# Patient Record
Sex: FEMALE | URBAN - METROPOLITAN AREA
[De-identification: names, ages, dates, MRNs, and addresses within clinical notes are randomized per-mention and may not be internally consistent; named-entity substitution may affect disease eponyms.]

---

## 2021-05-04 ENCOUNTER — APPOINTMENT (RX ONLY)
Dept: URBAN - METROPOLITAN AREA CLINIC 125 | Facility: CLINIC | Age: 56
Setting detail: DERMATOLOGY
End: 2021-05-04

## 2021-05-04 DIAGNOSIS — D22 MELANOCYTIC NEVI: ICD-10-CM

## 2021-05-04 DIAGNOSIS — L72.0 EPIDERMAL CYST: ICD-10-CM

## 2021-05-04 DIAGNOSIS — Q826 OTHER SPECIFIED ANOMALIES OF SKIN: ICD-10-CM

## 2021-05-04 DIAGNOSIS — Z71.89 OTHER SPECIFIED COUNSELING: ICD-10-CM

## 2021-05-04 DIAGNOSIS — L81.4 OTHER MELANIN HYPERPIGMENTATION: ICD-10-CM

## 2021-05-04 DIAGNOSIS — L82.1 OTHER SEBORRHEIC KERATOSIS: ICD-10-CM

## 2021-05-04 DIAGNOSIS — Q819 OTHER SPECIFIED ANOMALIES OF SKIN: ICD-10-CM

## 2021-05-04 DIAGNOSIS — Q828 OTHER SPECIFIED ANOMALIES OF SKIN: ICD-10-CM

## 2021-05-04 DIAGNOSIS — L60.8 OTHER NAIL DISORDERS: ICD-10-CM

## 2021-05-04 DIAGNOSIS — D18.0 HEMANGIOMA: ICD-10-CM

## 2021-05-04 DIAGNOSIS — L71.8 OTHER ROSACEA: ICD-10-CM

## 2021-05-04 PROBLEM — D18.01 HEMANGIOMA OF SKIN AND SUBCUTANEOUS TISSUE: Status: ACTIVE | Noted: 2021-05-04

## 2021-05-04 PROBLEM — L85.8 OTHER SPECIFIED EPIDERMAL THICKENING: Status: ACTIVE | Noted: 2021-05-04

## 2021-05-04 PROBLEM — D22.5 MELANOCYTIC NEVI OF TRUNK: Status: ACTIVE | Noted: 2021-05-04

## 2021-05-04 PROCEDURE — 99203 OFFICE O/P NEW LOW 30 MIN: CPT

## 2021-05-04 PROCEDURE — ? SUNSCREEN RECOMMENDATIONS

## 2021-05-04 PROCEDURE — ? COUNSELING

## 2021-05-04 PROCEDURE — ? NOTED ON EXAM BUT NOT TREATED

## 2021-05-04 ASSESSMENT — LOCATION DETAILED DESCRIPTION DERM
LOCATION DETAILED: LEFT PROXIMAL POSTERIOR UPPER ARM
LOCATION DETAILED: RIGHT PROXIMAL POSTERIOR UPPER ARM
LOCATION DETAILED: SUPERIOR THORACIC SPINE
LOCATION DETAILED: RIGHT INDEX FINGERNAIL
LOCATION DETAILED: LEFT CENTRAL MALAR CHEEK
LOCATION DETAILED: PERIUMBILICAL SKIN
LOCATION DETAILED: RIGHT MEDIAL BREAST 12-1:00 REGION
LOCATION DETAILED: LEFT MID-UPPER BACK
LOCATION DETAILED: LEFT SUPERIOR UPPER BACK
LOCATION DETAILED: UPPER STERNUM
LOCATION DETAILED: LEFT INDEX FINGERNAIL

## 2021-05-04 ASSESSMENT — LOCATION SIMPLE DESCRIPTION DERM
LOCATION SIMPLE: RIGHT INDEX FINGERNAIL
LOCATION SIMPLE: LEFT POSTERIOR UPPER ARM
LOCATION SIMPLE: UPPER BACK
LOCATION SIMPLE: RIGHT BREAST
LOCATION SIMPLE: ABDOMEN
LOCATION SIMPLE: CHEST
LOCATION SIMPLE: LEFT UPPER BACK
LOCATION SIMPLE: LEFT CHEEK
LOCATION SIMPLE: RIGHT POSTERIOR UPPER ARM
LOCATION SIMPLE: LEFT INDEX FINGERNAIL

## 2021-05-04 ASSESSMENT — LOCATION ZONE DERM
LOCATION ZONE: ARM
LOCATION ZONE: FACE
LOCATION ZONE: FINGERNAIL
LOCATION ZONE: TRUNK

## 2023-07-19 ENCOUNTER — APPOINTMENT (RX ONLY)
Dept: URBAN - METROPOLITAN AREA CLINIC 89 | Facility: CLINIC | Age: 58
Setting detail: DERMATOLOGY
End: 2023-07-19

## 2023-07-19 DIAGNOSIS — Z71.89 OTHER SPECIFIED COUNSELING: ICD-10-CM

## 2023-07-19 DIAGNOSIS — L82.1 OTHER SEBORRHEIC KERATOSIS: ICD-10-CM

## 2023-07-19 DIAGNOSIS — D485 NEOPLASM OF UNCERTAIN BEHAVIOR OF SKIN: ICD-10-CM

## 2023-07-19 DIAGNOSIS — D22 MELANOCYTIC NEVI: ICD-10-CM

## 2023-07-19 DIAGNOSIS — L81.4 OTHER MELANIN HYPERPIGMENTATION: ICD-10-CM

## 2023-07-19 PROBLEM — D22.5 MELANOCYTIC NEVI OF TRUNK: Status: ACTIVE | Noted: 2023-07-19

## 2023-07-19 PROBLEM — D48.5 NEOPLASM OF UNCERTAIN BEHAVIOR OF SKIN: Status: ACTIVE | Noted: 2023-07-19

## 2023-07-19 PROCEDURE — ? BIOPSY BY SHAVE METHOD

## 2023-07-19 PROCEDURE — 99213 OFFICE O/P EST LOW 20 MIN: CPT | Mod: 25

## 2023-07-19 PROCEDURE — ? SUNSCREEN RECOMMENDATIONS

## 2023-07-19 PROCEDURE — 11102 TANGNTL BX SKIN SINGLE LES: CPT

## 2023-07-19 PROCEDURE — ? COUNSELING

## 2023-07-19 ASSESSMENT — LOCATION ZONE DERM
LOCATION ZONE: LEG
LOCATION ZONE: TRUNK

## 2023-07-19 ASSESSMENT — LOCATION DETAILED DESCRIPTION DERM
LOCATION DETAILED: UPPER STERNUM
LOCATION DETAILED: LEFT ANTERIOR DISTAL THIGH
LOCATION DETAILED: SUPERIOR THORACIC SPINE
LOCATION DETAILED: LEFT MID-UPPER BACK

## 2023-07-19 ASSESSMENT — LOCATION SIMPLE DESCRIPTION DERM
LOCATION SIMPLE: LEFT UPPER BACK
LOCATION SIMPLE: CHEST
LOCATION SIMPLE: UPPER BACK
LOCATION SIMPLE: LEFT THIGH

## 2023-07-19 NOTE — PROCEDURE: BIOPSY BY SHAVE METHOD
Detail Level: Detailed
Depth Of Biopsy: dermis
Was A Bandage Applied: Yes
Size Of Lesion In Cm: 0.3
X Size Of Lesion In Cm: 0
Biopsy Type: H and E
Biopsy Method: Dermablade
Anesthesia Type: 1% lidocaine with epinephrine
Anesthesia Volume In Cc (Will Not Render If 0): 0.5
Hemostasis: Aluminum Chloride
Wound Care: Petrolatum
Dressing: bandage
Destruction After The Procedure: No
Type Of Destruction Used: Curettage
Curettage Text: The wound bed was treated with curettage after the biopsy was performed.
Cryotherapy Text: The wound bed was treated with cryotherapy after the biopsy was performed.
Electrodesiccation Text: The wound bed was treated with electrodesiccation after the biopsy was performed.
Electrodesiccation And Curettage Text: The wound bed was treated with electrodesiccation and curettage after the biopsy was performed.
Silver Nitrate Text: The wound bed was treated with silver nitrate after the biopsy was performed.
Lab: [id number]
Lab Facility: [id number]
Consent: Written consent was obtained and risks were reviewed including but not limited to scarring, infection, bleeding, scabbing, incomplete removal, nerve damage and allergy to anesthesia.
Post-Care Instructions: I reviewed with the patient in detail post-care instructions. Patient is to keep the biopsy site dry overnight, and then apply bacitracin twice daily until healed. Patient may apply hydrogen peroxide soaks to remove any crusting.
Notification Instructions: Patient will be notified of biopsy results. However, patient instructed to call the office if not contacted within 2 weeks.
Billing Type: Third-Party Bill
Information: Selecting Yes will display possible errors in your note based on the variables you have selected. This validation is only offered as a suggestion for you. PLEASE NOTE THAT THE VALIDATION TEXT WILL BE REMOVED WHEN YOU FINALIZE YOUR NOTE. IF YOU WANT TO FAX A PRELIMINARY NOTE YOU WILL NEED TO TOGGLE THIS TO 'NO' IF YOU DO NOT WANT IT IN YOUR FAXED NOTE.

## 2025-05-06 NOTE — PROCEDURE: COUNSELING
Physical Therapy Visit    Visit Type: Daily Treatment Note  Visit: 5  Referring Provider: Vitaliy Kingsley MD  Medical Diagnosis (from order): M43.16 - Spondylolisthesis of lumbar region     SUBJECTIVE                                                                                                               5/6/25:  patient feels good coming in with no pain.  She does have pain in her foot and went to neurologist last week and then saw podiatrist and she needs surgery on the foot. He suggested she finished up her back treatment first.   Patient states she has been doing the band exercises - she likes the over the door ones, the ankle ones seem to slip.  She continues to have to lie down at least 1x/day to relieve the pain - when we first started physical therapy had to do this 3x/day.  Walking tolerance only 5-10 min prior to back pain - Easter Sunday had to stop and lie down due to pain with setting the table     4/29/25:  Patient states her neuropathy is worse this week and having concurrent foot pain on the right.  She will be seeing her doctor for this tomorrow - she is seeing the neurology.   She states her back pain comes and goes - she doesn't see much change since start of physical therapy.   Exercises are going fine.   Patient doesn't recall how she felt after last visit but does not recall feeling worse.  Patient did not call [facility] to discuss any medication change - she is not currently on meds.       Functional Change: She is very careful with activities - avoids any lifting/carrying, housework, walking and standing tolerance still only 5-10 min    Pain / Symptoms  - Pain rating (out of 10): Current: 0       OBJECTIVE                                                                                                                                         Treatment     Therapeutic Exercise  Nutep (seat 9, UE 10) x level 6, x 6 min    Standing:  - bilateral shoulder extension  progression to red  band x 10 
with cues for form   - shoulder adduction with progression to red band x 10 right, x 10 left     Standing at elevated mat   - hip abduction with progression to red  band at ankles x 10 right, x 10 left, 2 sets  - hip extension with progression to red  band at ankles x 10 right, x 10 left, 2 sets  - lumbar/shoulder flexion stretch x 15 sec hold x 5    Supine/hooklying:  - single knee to chest with opposite leg straight to assist in stretching gluteal/low back and hip flexor on opposite leg x 30 sec hold x 2 right, x 2 left      Manual Therapy   Supine/hooklying;  - 90/90 hamstring stretch, single knee to chest, piriformis stretch  - hooklying hip distraction right/left   - long axis leg pull distraction right/left unilaterally then bilaterally     Skilled input: verbal instruction/cues    Writer verbally educated and received verbal consent for hand placement, positioning of patient, and techniques to be performed today from patient for hand placement and palpation for techniques as described above and how they are pertinent to the patient's plan of care.  Home Exercise Program  Access Code: [email or web address]  URL: [email or web address]  Date: 04/29/2025  Prepared by: Molly Ríos    Exercises  - Supine Posterior Pelvic Tilt  - 2 x daily - 7 x weekly - 1-3 sets - 10 reps  - Supine Lower Trunk Rotation  - 2 x daily - 7 x weekly - 1 sets - 5-10 reps  - Supine Hip Adduction Isometric with Ball  - 1 x daily - 7 x weekly - 1-2 sets - 10 reps  - Standing Hip Abduction Kicks  - 1 x daily - 7 x weekly - 1-2 sets - 10 reps  - Standing Hip Extension Kicks  - 1 x daily - 7 x weekly - 1-2 sets - 10 reps  - Hooklying Clamshell with Resistance  - 1 x daily - 7 x weekly - 1-2 sets - 10 reps  - Seated Hamstring Stretch  - 2 x daily - 7 x weekly - 2-5 reps - 20-30 sec hold  - Seated Figure 4 Piriformis Stretch  - 2 x daily - 7 x weekly - 2-5 reps - 20-30 sec hold  - Shoulder extension with resistance - Neutral  - 1 x 
daily - 7 x weekly - 2 sets - 10 reps - 3 sec hold  - Shoulder Adduction with Anchored Resistance  - 1 x daily - 7 x weekly - 2 sets - 10 reps - 3 sec  hold  - Supine Single Knee to Chest  - 1-2 x daily - 7 x weekly - 5-10 reps - 15-30 sec hold  - Standing Lumbar Spine Flexion Stretch Counter  - 2 x daily - 7 x weekly - 5-10 reps - 15 sec hold      ASSESSMENT                                                                                                            Pain  0/10   Patient states she again feels really good leaving visit.  She tolerated increased resistance to the red band without pain.  Patient is still limited in standing/walking tolerance (5-10 min) from her stenosis and neuropathy into her feet.  This is her most limiting factor. Patient will continue to benefit from skilled physical therapy for increasing core and hip strength, improve mobility of hips, flexibility of of hip flexors, quadricep, iliotibial band, hamstring, piriformis, and improve body mechanics to attain functional long term goals    Statement of medical necessity: Patient is restricted and limited with pain to certain activities, home management and self care.     PROBLEM LIST:  1. Decreased core and hip strength   2. Decreased flexibility of hip flexor, iliotibial band, quadriceps, piriformis  3. High pain levels (5-10/10)  4.  Poor functional activity tolerance - body mechanics  Education:   - Results of above outlined education: Verbalizes understanding and Demonstrates understanding    PLAN                                                                                                                           Suggestions for next session as indicated: Progress per plan of care  - Nustep, core and LE strengthening (primarily hip extensors and abductors), functional strengthening, flexibility of hip flexors, quadricep, iliotibial band, hamstring, piriformis, body mechanics for back protection   - complete note for 
progress/continuation vs discharge        Therapy procedure time and total treatment time can be found documented on the Time Entry flowsheet    
Detail Level: Generalized
Detail Level: Detailed